# Patient Record
(demographics unavailable — no encounter records)

---

## 2024-11-11 NOTE — DISCUSSION/SUMMARY
[de-identified] : I went over the pathophysiology of the patient's symptoms in great detail with the patient. I discussed the underlying pathophysiology of the patient's condition in great detail with the patient. I went over the patient's x-rays with them in great detail. The patient elected to receive an aspiration and cortisone injection into his left knee today, and tolerated it well. I instructed the patient on ROM exercises, and told them to take it easy. The use of ice and rest was reviewed with the patient. The patient may resume activities tomorrow. Viscosupplementation was discussed as a solution to the patient's symptoms and a booklet with information was provided.   All of their questions were answered. They understand and consent to the plan.  FU in 4-6 weeks. If he does not improve we will consider an MRI of his left knee upon return.

## 2024-11-11 NOTE — ADDENDUM
[FreeTextEntry1] : I, BLANCA CAED, acted solely as a scribe for Dr. Kaiser Romero on this date 11/11/2024.  All medical record entries made by the Scribe were at my, Dr. Kaiser Romero, direction and personally dictated by me on 11/11/2024. I have reviewed the chart and agree that the record accurately reflects my personal performance of the history, physical exam, assessment and plan. I have also personally directed, reviewed, and agreed with the chart.

## 2024-11-11 NOTE — PHYSICAL EXAM
[de-identified] : Constitutional o Appearance : well-nourished, well developed, alert, in no acute distress  Head and Face o Head :  Inspection : atraumatic, normocephalic o Face :  Inspection : no visible rash or discoloration Respiratory o Respiratory Effort: breathing unlabored  Neurologic o Mental Status Examination :  Orientation : alert and oriented X 3 Psychiatric o Mood and Affect: mood normal, affect appropriate Cardiovascular o Observation/Palpation : - no swelling Lymphatic o Additional Nodes : No palpable lymph nodes present  Right Lower Extremity o Buttock : no tenderness, swelling or deformities  o Right Hip :  Inspection/Palpation : no tenderness, swelling or deformities  Range of Motion : full and painless in all planes, no crepitance  Stability : joint stability intact  Strength : extension, flexion, adduction, abduction, internal rotation and external rotation 5/5   o Right Knee :  Inspection/Palpation : no tenderness to palpation, no swelling  Range of Motion : active flexion and extension full and painless, no crepitance  Stability : no valgus or varus instability present on provocative testing  Strength : flexion and extension 5/5  Tests and Signs : negative Anterior Drawer, negative Lachman, negative Garcia  Left Lower Extremity o Buttock : no tenderness, swelling or deformities  o Left Hip :  Inspection/Palpation : no tenderness, no swelling or deformities  Range of Motion : full and painless in all planes, no crepitance  Stability : joint stability intact  Strength : extension, flexion, adduction, abduction, internal rotation and external rotation 5/5  o Left Knee :  Inspection/Palpation : mild effusion, lateral compartment  tenderness to palpation, mild swelling  Range of Motion : lacks 10 degrees of extension,  ,  cannot fully extend his left knee,  patella crepitance  Stability : no valgus or varus instability present on provocative testing  Strength : flexion and extension 5/5  Tests and Signs : negative Anterior Drawer, negative Lachman, negative Garcia  Gait and Station: Gait: gait normal, swelling of his left knee, short stride, no significant extremity swelling or lymphedema, good proprioception and balance  Radiology Results 11/11/2024 o Left Knee: Standing AP, lateral, tunnel, and skyline views of the knee were obtained and reveal moderate lateral and patellofemoral arthritis and no lytic lesions   o Under sterile preparation 40 ccs of clear fluid was aspirated from the left knee, then 5cc's lidocaine, 0.5cc's kenalog, and 0.5 cc's dexamethasone were given. A compression dressing was applied. The patient tolerated the procedure well Pain free and full extension.

## 2024-12-10 NOTE — HISTORY OF PRESENT ILLNESS
[de-identified] : 54-year-old male with PMHx of Hypothyroidism, presents for follow-up of left knee pain for 1 month. Patient received a steroid injection last visit which temporarily provided relief for 3 days. He reports he attempted to run due to improvement in pain. The initial time he ran, he was able to do so with minimal pain, however, during his second attempt, his pain returned. Patient continues to report constant, left lateral knee pain, radiating down to his left foot, described as "aching" and rated 5/10 on the pain scale. Patient is an active runner, who runs 35 miles per week, 4 days a week, but denies any recent falls, trauma, or other injuries that precipitated the onset of his knee pain.  Patient otherwise states that he has been in his usual state of health. Patient ambulates without assistance and can perform ADL's independently. Patient denies any fever, chills, headache, dizziness, shortness of breath, chest pain, palpitations, abdominal pain, new onset urinary/bowel incontinence, saddle paresthesia, or other acute complaints at this time.

## 2024-12-10 NOTE — PHYSICAL EXAM
[de-identified] : Constitutional o Appearance : well-nourished, well developed, alert, in no acute distress  Head and Face o Head :  Inspection : atraumatic, normocephalic o Face :  Inspection : no visible rash or discoloration Respiratory o Respiratory Effort: breathing unlabored  Neurologic o Mental Status Examination :  Orientation : alert and oriented X 3 Psychiatric o Mood and Affect: mood normal, affect appropriate Cardiovascular o Observation/Palpation : - no swelling Lymphatic o Additional Nodes : No palpable lymph nodes present  Right Lower Extremity o Buttock : no tenderness, swelling or deformities  o Right Hip :  Inspection/Palpation : no tenderness, swelling or deformities  Range of Motion : full and painless in all planes, no crepitance  Stability : joint stability intact  Strength : extension, flexion, adduction, abduction, internal rotation and external rotation 5/5   o Right Knee :  Inspection/Palpation : no tenderness to palpation, no swelling  Range of Motion : active flexion and extension full and painless, no crepitance  Stability : no valgus or varus instability present on provocative testing  Strength : flexion and extension 5/5  Tests and Signs : negative Anterior Drawer, negative Lachman, negative Garcia  Left Lower Extremity o Buttock : no tenderness, swelling or deformities  o Left Hip :  Inspection/Palpation : no tenderness, no swelling or deformities  Range of Motion : full and painless in all planes, no crepitance  Stability : joint stability intact  Strength : extension, flexion, adduction, abduction, internal rotation and external rotation 5/5  o Left Knee :  Inspection/Palpation: mild effusion, no lateral compartment tenderness to palpation, mild swelling, mild interior patella tenderness, mild lateral facet tenderness  Range of Motion : 0-140   Stability : no valgus or varus instability present on provocative testing  Strength : flexion and extension 5/5  Tests and Signs : negative Anterior Drawer, negative Lachman, negative Garcia  Gait and Station: Gait: gait normal, swelling of his left knee, short stride, no significant extremity swelling or lymphedema, good proprioception and balance  Radiology Results 11/11/2024 o Left Knee: Standing AP, lateral, tunnel, and skyline views of the knee were obtained and reveal moderate lateral and patellofemoral arthritis and no lytic lesions

## 2024-12-10 NOTE — DISCUSSION/SUMMARY
[de-identified] : I went over the pathophysiology of the patient's symptoms in great detail with the patient.  The extent of the patient's arthritis was discussed in great detail with them. The use of ice and rest as well as activity modifications was reviewed with the patient.  Viscosupplementation was discussed as a solution to the patient's symptoms and a booklet with information was provided.  We are requesting authorization for an MRI of the patients left knee to rule out lateral meniscus pathology.   All of their questions were answered. They understand and consent to the plan.  FU in 4-6 weeks. If he does not improve we will consider an MRI of his left knee upon return.

## 2024-12-10 NOTE — ADDENDUM
[FreeTextEntry1] : This note was written by Shahana Chase on 12/10/2024 acting solely as a scribe for Dr. Kaiser Romero MD.   All medical record entries made by the Scribe were at my, Dr. Kaiser Romero MD, direction and personally dictated by me on 12/10/2024. I have personally reviewed the chart and agree that the record accurately reflects my personal performance of the history, physical exam, assessment and plan.